# Patient Record
Sex: MALE | Race: WHITE | NOT HISPANIC OR LATINO | Employment: OTHER | URBAN - METROPOLITAN AREA
[De-identification: names, ages, dates, MRNs, and addresses within clinical notes are randomized per-mention and may not be internally consistent; named-entity substitution may affect disease eponyms.]

---

## 2018-05-22 ENCOUNTER — TRANSCRIBE ORDERS (OUTPATIENT)
Dept: ADMINISTRATIVE | Facility: HOSPITAL | Age: 68
End: 2018-05-22

## 2018-05-22 DIAGNOSIS — M54.2 CERVICALGIA: Primary | ICD-10-CM

## 2018-05-27 ENCOUNTER — HOSPITAL ENCOUNTER (OUTPATIENT)
Dept: CT IMAGING | Facility: HOSPITAL | Age: 68
Discharge: HOME | End: 2018-05-27
Attending: INTERNAL MEDICINE
Payer: COMMERCIAL

## 2018-05-27 DIAGNOSIS — M54.2 CERVICALGIA: ICD-10-CM

## 2018-05-27 PROCEDURE — 72125 CT NECK SPINE W/O DYE: CPT

## 2019-10-29 ENCOUNTER — TRANSCRIBE ORDERS (OUTPATIENT)
Dept: ADMINISTRATIVE | Facility: HOSPITAL | Age: 69
End: 2019-10-29

## 2019-10-29 DIAGNOSIS — I49.9 IRREGULAR HEARTBEAT: Primary | ICD-10-CM

## 2019-12-03 ENCOUNTER — HOSPITAL ENCOUNTER (OUTPATIENT)
Dept: NON INVASIVE DIAGNOSTICS | Facility: CLINIC | Age: 69
Discharge: HOME | End: 2019-12-03
Payer: COMMERCIAL

## 2019-12-03 DIAGNOSIS — I49.9 IRREGULAR HEARTBEAT: ICD-10-CM

## 2019-12-03 PROCEDURE — 93226 XTRNL ECG REC<48 HR SCAN A/R: CPT

## 2019-12-03 PROCEDURE — 93225 XTRNL ECG REC<48 HRS REC: CPT

## 2019-12-06 PROCEDURE — 93227 XTRNL ECG REC<48 HR R&I: CPT | Performed by: INTERNAL MEDICINE

## 2020-10-12 ENCOUNTER — TRANSCRIBE ORDERS (OUTPATIENT)
Dept: ADMINISTRATIVE | Facility: HOSPITAL | Age: 70
End: 2020-10-12

## 2020-10-12 DIAGNOSIS — R10.84 ABDOMINAL PAIN, GENERALIZED: Primary | ICD-10-CM

## 2020-10-14 ENCOUNTER — HOSPITAL ENCOUNTER (OUTPATIENT)
Dept: CT IMAGING | Facility: HOSPITAL | Age: 70
Discharge: HOME | End: 2020-10-14
Payer: COMMERCIAL

## 2020-10-14 DIAGNOSIS — R10.84 ABDOMINAL PAIN, GENERALIZED: ICD-10-CM

## 2020-10-14 PROCEDURE — 74177 CT ABD & PELVIS W/CONTRAST: CPT

## 2020-10-14 PROCEDURE — G1004 CDSM NDSC: HCPCS

## 2020-10-14 RX ADMIN — IOHEXOL 100 ML: 350 INJECTION, SOLUTION INTRAVENOUS at 09:10

## 2021-02-13 DIAGNOSIS — Z23 ENCOUNTER FOR IMMUNIZATION: ICD-10-CM

## 2021-02-25 ENCOUNTER — IMMUNIZATIONS (OUTPATIENT)
Dept: FAMILY MEDICINE CLINIC | Facility: HOSPITAL | Age: 71
End: 2021-02-25

## 2021-02-25 DIAGNOSIS — Z23 ENCOUNTER FOR IMMUNIZATION: Primary | ICD-10-CM

## 2021-02-25 PROCEDURE — 0001A SARS-COV-2 / COVID-19 MRNA VACCINE (PFIZER-BIONTECH) 30 MCG: CPT

## 2021-02-25 PROCEDURE — 91300 SARS-COV-2 / COVID-19 MRNA VACCINE (PFIZER-BIONTECH) 30 MCG: CPT

## 2021-03-16 ENCOUNTER — IMMUNIZATIONS (OUTPATIENT)
Dept: FAMILY MEDICINE CLINIC | Facility: HOSPITAL | Age: 71
End: 2021-03-16

## 2021-03-16 DIAGNOSIS — Z23 ENCOUNTER FOR IMMUNIZATION: Primary | ICD-10-CM

## 2021-03-16 PROCEDURE — 0002A SARS-COV-2 / COVID-19 MRNA VACCINE (PFIZER-BIONTECH) 30 MCG: CPT

## 2021-03-16 PROCEDURE — 91300 SARS-COV-2 / COVID-19 MRNA VACCINE (PFIZER-BIONTECH) 30 MCG: CPT

## 2021-08-23 ENCOUNTER — OFFICE VISIT (OUTPATIENT)
Dept: LAB | Facility: CLINIC | Age: 71
End: 2021-08-23
Payer: COMMERCIAL

## 2021-08-23 DIAGNOSIS — Z01.818 PRE-OP TESTING: ICD-10-CM

## 2021-08-23 PROCEDURE — 93005 ELECTROCARDIOGRAM TRACING: CPT

## 2021-08-25 LAB
ATRIAL RATE: 73 BPM
P AXIS: 53 DEGREES
PR INTERVAL: 178 MS
QRS AXIS: -46 DEGREES
QRSD INTERVAL: 94 MS
QT INTERVAL: 412 MS
QTC INTERVAL: 453 MS
T WAVE AXIS: 44 DEGREES
VENTRICULAR RATE: 73 BPM

## 2021-08-25 PROCEDURE — 93010 ELECTROCARDIOGRAM REPORT: CPT | Performed by: INTERNAL MEDICINE

## 2022-11-03 ENCOUNTER — TELEPHONE (OUTPATIENT)
Dept: GASTROENTEROLOGY | Facility: CLINIC | Age: 72
End: 2022-11-03

## 2022-11-09 ENCOUNTER — OFFICE VISIT (OUTPATIENT)
Dept: GASTROENTEROLOGY | Facility: CLINIC | Age: 72
End: 2022-11-09

## 2022-11-09 ENCOUNTER — TELEPHONE (OUTPATIENT)
Dept: GASTROENTEROLOGY | Facility: CLINIC | Age: 72
End: 2022-11-09

## 2022-11-09 VITALS
WEIGHT: 167 LBS | HEART RATE: 66 BPM | BODY MASS INDEX: 26.84 KG/M2 | SYSTOLIC BLOOD PRESSURE: 166 MMHG | HEIGHT: 66 IN | DIASTOLIC BLOOD PRESSURE: 75 MMHG

## 2022-11-09 DIAGNOSIS — Z86.79 HISTORY OF IRREGULAR HEARTBEAT: ICD-10-CM

## 2022-11-09 DIAGNOSIS — K59.1 FUNCTIONAL DIARRHEA: Primary | ICD-10-CM

## 2022-11-09 DIAGNOSIS — K21.9 GASTROESOPHAGEAL REFLUX DISEASE, UNSPECIFIED WHETHER ESOPHAGITIS PRESENT: ICD-10-CM

## 2022-11-09 RX ORDER — CETIRIZINE HYDROCHLORIDE 10 MG/1
10 TABLET ORAL DAILY
COMMUNITY

## 2022-11-09 RX ORDER — OLMESARTAN MEDOXOMIL 20 MG/1
TABLET ORAL
COMMUNITY
Start: 2022-11-07

## 2022-11-09 NOTE — PROGRESS NOTES
[facility] - Outpatient Consultation  Surya Ordoñez 67 y o  male MRN: [id number]  Encounter: [id number]          ASSESSMENT AND PLAN:      1  Functional diarrhea  Patient with history of suspected functional diarrhea  Patient did have random biopsy of colon before which were negative for microscopic colitis and patient is only having occasional loose stools after coffee few times a week  Plan colonoscopy, MiraLax prep can be used  - Colonoscopy; Future    2  Gastroesophageal reflux disease, unspecified whether esophagitis present  Patient with history of longstanding GERD and recommended EGD for further evaluation of reflux related complications the patient does not want to undergo EGD  He has chronic sore throat and feels that this exacerbates his symptoms  3  History of irregular heartbeat  Patient did have some skipped beats on exam today and I advised him to follow up with primary doctor but he reports that he had this in the past and did have monitor which was negative  Patient is asymptomatic without any evidence of palpitations  ______________________________________________________________________    HPI:    This is a 70-year-old male who was previously seen by our practice  Patient had colonoscopy in 2012 and at that point was having intermittent diarrhea which was thought to be functional with a component of lactose intolerance and he presents today for consultation to colonoscopy  Prior colonoscopy did show universal diverticulosis 1 small polyp was removed and a mildly redundant colon  Patient did have recent blood work through primary doctor which revealed normal CBC CMP and thyroid studies  He otherwise reports that he has no significant abdominal pain  Endorses that he has 1 loose stool after drinking coffee  He otherwise states that this does not happen every day  Denies any significant weight loss or any history of GI malignancy    Does have history of longstanding GERD and is taking Nexium which controls his symptoms  REVIEW OF SYSTEMS:    CONSTITUTIONAL: Denies any fever, chills, rigors, and weight loss  HEENT: No earache or tinnitus  Denies hearing loss or visual disturbances  CARDIOVASCULAR: No chest pain or palpitations  RESPIRATORY: Denies any cough, hemoptysis, shortness of breath or dyspnea on exertion  GASTROINTESTINAL: As noted in the History of Present Illness  GENITOURINARY: No problems with urination  Denies any hematuria or dysuria  NEUROLOGIC: No dizziness or vertigo, denies headaches  MUSCULOSKELETAL: Denies any muscle or joint pain  SKIN: Denies skin rashes or itching  ENDOCRINE: Denies excessive thirst  Denies intolerance to heat or cold  PSYCHOSOCIAL: Denies depression or anxiety  Denies any recent memory loss  Historical Information   Past Medical History:   Diagnosis Date   • Ear problems    • Otitis media      Past Surgical History:   Procedure Laterality Date   • ANTERIOR CERVICAL DISCECTOMY W/ FUSION     • DISCECTOMY SPINE CERVICAL ANTERIOR     • TYMPANOSTOMY TUBE PLACEMENT Bilateral      Social History   Social History     Substance and Sexual Activity   Alcohol Use None     Social History     Substance and Sexual Activity   Drug Use Not on file     Social History     Tobacco Use   Smoking Status Unknown If Ever Smoked   Smokeless Tobacco Never Used     History reviewed  No pertinent family history      Meds/Allergies       Current Outpatient Medications:   •  amLODIPine (NORVASC) 5 mg tablet  •  Ascorbic Acid (VITAMIN C PO)  •  busPIRone (BUSPAR) 15 mg tablet  •  cetirizine (ZyrTEC) 10 mg tablet  •  Cyanocobalamin (VITAMIN B12 PO)  •  esomeprazole (NexIUM) 40 MG capsule  •  rosuvastatin (CRESTOR) 10 MG tablet  •  Spiriva HandiHaler 18 MCG inhalation capsule  •  VITAMIN E PO  •  ciprofloxacin-dexamethasone (CIPRODEX) otic suspension  •  olmesartan (BENICAR) 20 mg tablet    Allergies   Allergen Reactions • Sulfa Antibiotics    • Tetracycline            Objective     Blood pressure 166/75, pulse 66, height 5' 6" (1 676 m), weight 75 8 kg (167 lb)  Body mass index is 26 95 kg/m²  PHYSICAL EXAM:      General Appearance:   Alert, cooperative, no distress   HEENT:   Normocephalic, atraumatic, anicteric      Neck:  Supple, symmetrical, trachea midline   Lungs:   Clear to auscultation bilaterally; no rales, rhonchi or wheezing; respirations unlabored    Heart[de-identified]   Regular rate and irregular rhythm which appears to be skipped beat; no murmur, rub, or gallop  Abdomen:   Soft, non-tender, non-distended; normal bowel sounds; no masses, no organomegaly    Genitalia:   Deferred    Rectal:   Deferred    Extremities:  No cyanosis, clubbing or edema    Pulses:  2+ and symmetric    Skin:  No jaundice, rashes, or lesions    Lymph nodes:  No palpable cervical lymphadenopathy        Lab Results:   No visits with results within 1 Day(s) from this visit  Latest known visit with results is:   Office Visit on 08/23/2021   Component Date Value   • Ventricular Rate 08/23/2021 73    • Atrial Rate 08/23/2021 73    • AL Interval 08/23/2021 178    • QRSD Interval 08/23/2021 94    • QT Interval 08/23/2021 412    • QTC Interval 08/23/2021 453    • P Axis 08/23/2021 53    • QRS Axis 08/23/2021 -46    • T Wave Axis 08/23/2021 44          Radiology Results:   No results found

## 2022-11-09 NOTE — TELEPHONE ENCOUNTER
Scheduled date of colonoscopy (as of today): 11/29/22  Physician performing colonoscopy:Nura  Location of colonoscopy:  [facility]  Bowel prep reviewed with patient: Ruperto / jaida  Instructions reviewed with patient by: Aletha Perry  Clearances: None

## 2022-11-09 NOTE — H&P (VIEW-ONLY)
[facility] - Outpatient Consultation  Elmo Schilder 67 y o  male MRN: [id number]  Encounter: [id number]          ASSESSMENT AND PLAN:      1  Functional diarrhea  Patient with history of suspected functional diarrhea  Patient did have random biopsy of colon before which were negative for microscopic colitis and patient is only having occasional loose stools after coffee few times a week  Plan colonoscopy, MiraLax prep can be used  - Colonoscopy; Future    2  Gastroesophageal reflux disease, unspecified whether esophagitis present  Patient with history of longstanding GERD and recommended EGD for further evaluation of reflux related complications the patient does not want to undergo EGD  He has chronic sore throat and feels that this exacerbates his symptoms  3  History of irregular heartbeat  Patient did have some skipped beats on exam today and I advised him to follow up with primary doctor but he reports that he had this in the past and did have monitor which was negative  Patient is asymptomatic without any evidence of palpitations  ______________________________________________________________________    HPI:    This is a 66-year-old male who was previously seen by our practice  Patient had colonoscopy in 2012 and at that point was having intermittent diarrhea which was thought to be functional with a component of lactose intolerance and he presents today for consultation to colonoscopy  Prior colonoscopy did show universal diverticulosis 1 small polyp was removed and a mildly redundant colon  Patient did have recent blood work through primary doctor which revealed normal CBC CMP and thyroid studies  He otherwise reports that he has no significant abdominal pain  Endorses that he has 1 loose stool after drinking coffee  He otherwise states that this does not happen every day  Denies any significant weight loss or any history of GI malignancy    Does have history of longstanding GERD and is taking Nexium which controls his symptoms  REVIEW OF SYSTEMS:    CONSTITUTIONAL: Denies any fever, chills, rigors, and weight loss  HEENT: No earache or tinnitus  Denies hearing loss or visual disturbances  CARDIOVASCULAR: No chest pain or palpitations  RESPIRATORY: Denies any cough, hemoptysis, shortness of breath or dyspnea on exertion  GASTROINTESTINAL: As noted in the History of Present Illness  GENITOURINARY: No problems with urination  Denies any hematuria or dysuria  NEUROLOGIC: No dizziness or vertigo, denies headaches  MUSCULOSKELETAL: Denies any muscle or joint pain  SKIN: Denies skin rashes or itching  ENDOCRINE: Denies excessive thirst  Denies intolerance to heat or cold  PSYCHOSOCIAL: Denies depression or anxiety  Denies any recent memory loss  Historical Information   Past Medical History:   Diagnosis Date   • Ear problems    • Otitis media      Past Surgical History:   Procedure Laterality Date   • ANTERIOR CERVICAL DISCECTOMY W/ FUSION     • DISCECTOMY SPINE CERVICAL ANTERIOR     • TYMPANOSTOMY TUBE PLACEMENT Bilateral      Social History   Social History     Substance and Sexual Activity   Alcohol Use None     Social History     Substance and Sexual Activity   Drug Use Not on file     Social History     Tobacco Use   Smoking Status Unknown If Ever Smoked   Smokeless Tobacco Never Used     History reviewed  No pertinent family history      Meds/Allergies       Current Outpatient Medications:   •  amLODIPine (NORVASC) 5 mg tablet  •  Ascorbic Acid (VITAMIN C PO)  •  busPIRone (BUSPAR) 15 mg tablet  •  cetirizine (ZyrTEC) 10 mg tablet  •  Cyanocobalamin (VITAMIN B12 PO)  •  esomeprazole (NexIUM) 40 MG capsule  •  rosuvastatin (CRESTOR) 10 MG tablet  •  Spiriva HandiHaler 18 MCG inhalation capsule  •  VITAMIN E PO  •  ciprofloxacin-dexamethasone (CIPRODEX) otic suspension  •  olmesartan (BENICAR) 20 mg tablet    Allergies   Allergen Reactions • Sulfa Antibiotics    • Tetracycline            Objective     Blood pressure 166/75, pulse 66, height 5' 6" (1 676 m), weight 75 8 kg (167 lb)  Body mass index is 26 95 kg/m²  PHYSICAL EXAM:      General Appearance:   Alert, cooperative, no distress   HEENT:   Normocephalic, atraumatic, anicteric      Neck:  Supple, symmetrical, trachea midline   Lungs:   Clear to auscultation bilaterally; no rales, rhonchi or wheezing; respirations unlabored    Heart[de-identified]   Regular rate and irregular rhythm which appears to be skipped beat; no murmur, rub, or gallop  Abdomen:   Soft, non-tender, non-distended; normal bowel sounds; no masses, no organomegaly    Genitalia:   Deferred    Rectal:   Deferred    Extremities:  No cyanosis, clubbing or edema    Pulses:  2+ and symmetric    Skin:  No jaundice, rashes, or lesions    Lymph nodes:  No palpable cervical lymphadenopathy        Lab Results:   No visits with results within 1 Day(s) from this visit  Latest known visit with results is:   Office Visit on 08/23/2021   Component Date Value   • Ventricular Rate 08/23/2021 73    • Atrial Rate 08/23/2021 73    • KS Interval 08/23/2021 178    • QRSD Interval 08/23/2021 94    • QT Interval 08/23/2021 412    • QTC Interval 08/23/2021 453    • P Axis 08/23/2021 53    • QRS Axis 08/23/2021 -46    • T Wave Axis 08/23/2021 44          Radiology Results:   No results found

## 2022-11-21 ENCOUNTER — TELEPHONE (OUTPATIENT)
Dept: GASTROENTEROLOGY | Facility: CLINIC | Age: 72
End: 2022-11-21

## 2022-11-21 NOTE — TELEPHONE ENCOUNTER
lmom confirming pt's colonoscopy scheduled on 11/28/22 at [facility] with Dr Lala Main  Informed [facility] would be calling the  with the arrival time  Informed of clear liquid diet day prior as well as the bowel cleansing preparation  Informed would need a  the day of the procedure due to being under sedation  I asked pt to please call back if has not received instructions or if has any questions  Advised pt to contact insurance if has any questions regarding coverage for procedure

## 2022-11-28 ENCOUNTER — HOSPITAL ENCOUNTER (OUTPATIENT)
Dept: GASTROENTEROLOGY | Facility: HOSPITAL | Age: 72
Setting detail: OUTPATIENT SURGERY
Discharge: HOME | End: 2022-11-28

## 2022-11-28 ENCOUNTER — ANESTHESIA EVENT (OUTPATIENT)
Dept: GASTROENTEROLOGY | Facility: HOSPITAL | Age: 72
End: 2022-11-28

## 2022-11-28 ENCOUNTER — ANESTHESIA (OUTPATIENT)
Dept: GASTROENTEROLOGY | Facility: HOSPITAL | Age: 72
End: 2022-11-28

## 2022-11-28 VITALS
RESPIRATION RATE: 13 BRPM | TEMPERATURE: 97.8 F | SYSTOLIC BLOOD PRESSURE: 145 MMHG | BODY MASS INDEX: 25.65 KG/M2 | WEIGHT: 159.6 LBS | HEIGHT: 66 IN | OXYGEN SATURATION: 99 % | DIASTOLIC BLOOD PRESSURE: 83 MMHG | HEART RATE: 59 BPM

## 2022-11-28 DIAGNOSIS — K59.1 FUNCTIONAL DIARRHEA: ICD-10-CM

## 2022-11-28 RX ORDER — PROPOFOL 10 MG/ML
INJECTION, EMULSION INTRAVENOUS AS NEEDED
Status: DISCONTINUED | OUTPATIENT
Start: 2022-11-28 | End: 2022-11-28

## 2022-11-28 RX ORDER — SODIUM CHLORIDE, SODIUM LACTATE, POTASSIUM CHLORIDE, CALCIUM CHLORIDE 600; 310; 30; 20 MG/100ML; MG/100ML; MG/100ML; MG/100ML
INJECTION, SOLUTION INTRAVENOUS CONTINUOUS PRN
Status: DISCONTINUED | OUTPATIENT
Start: 2022-11-28 | End: 2022-11-28

## 2022-11-28 RX ORDER — LANOLIN ALCOHOL/MO/W.PET/CERES
1 CREAM (GRAM) TOPICAL 3 TIMES DAILY
COMMUNITY

## 2022-11-28 RX ORDER — LIDOCAINE HYDROCHLORIDE 10 MG/ML
INJECTION, SOLUTION EPIDURAL; INFILTRATION; INTRACAUDAL; PERINEURAL AS NEEDED
Status: DISCONTINUED | OUTPATIENT
Start: 2022-11-28 | End: 2022-11-28

## 2022-11-28 RX ORDER — MULTIVITAMIN
1 CAPSULE ORAL DAILY
COMMUNITY

## 2022-11-28 RX ORDER — VITAMIN B COMPLEX
1 CAPSULE ORAL DAILY
COMMUNITY

## 2022-11-28 RX ORDER — ZINC SULFATE 50(220)MG
220 CAPSULE ORAL DAILY
COMMUNITY

## 2022-11-28 RX ADMIN — PROPOFOL 40 MG: 10 INJECTION, EMULSION INTRAVENOUS at 11:43

## 2022-11-28 RX ADMIN — PROPOFOL 20 MG: 10 INJECTION, EMULSION INTRAVENOUS at 11:49

## 2022-11-28 RX ADMIN — PROPOFOL 40 MG: 10 INJECTION, EMULSION INTRAVENOUS at 11:37

## 2022-11-28 RX ADMIN — PROPOFOL 40 MG: 10 INJECTION, EMULSION INTRAVENOUS at 11:46

## 2022-11-28 RX ADMIN — LIDOCAINE HYDROCHLORIDE 50 MG: 10 INJECTION, SOLUTION EPIDURAL; INFILTRATION; INTRACAUDAL; PERINEURAL at 11:25

## 2022-11-28 RX ADMIN — PROPOFOL 100 MG: 10 INJECTION, EMULSION INTRAVENOUS at 11:25

## 2022-11-28 RX ADMIN — PROPOFOL 50 MG: 10 INJECTION, EMULSION INTRAVENOUS at 11:31

## 2022-11-28 RX ADMIN — PROPOFOL 40 MG: 10 INJECTION, EMULSION INTRAVENOUS at 11:40

## 2022-11-28 RX ADMIN — SODIUM CHLORIDE, SODIUM LACTATE, POTASSIUM CHLORIDE, AND CALCIUM CHLORIDE: .6; .31; .03; .02 INJECTION, SOLUTION INTRAVENOUS at 11:23

## 2022-11-28 RX ADMIN — PROPOFOL 50 MG: 10 INJECTION, EMULSION INTRAVENOUS at 11:28

## 2022-11-28 RX ADMIN — PROPOFOL 40 MG: 10 INJECTION, EMULSION INTRAVENOUS at 11:34

## 2022-11-28 NOTE — INTERVAL H&P NOTE
H&P reviewed  After examining the patient I find no changes in the patients condition since the H&P had been written      Vitals:    11/28/22 1019   BP: (!) 171/70   Pulse: 71   Resp: 15   Temp: 97 8 °F (36 6 °C)   SpO2: 100%

## 2022-11-28 NOTE — ANESTHESIA PREPROCEDURE EVALUATION
Procedure:  COLONOSCOPY    Relevant Problems   PULMONARY   (+) COPD (chronic obstructive pulmonary disease) (HCC)        Physical Exam    Airway    Mallampati score: II  TM Distance: >3 FB  Neck ROM: full     Dental   lower dentures,     Cardiovascular      Pulmonary      Other Findings        Anesthesia Plan  ASA Score- 2     Anesthesia Type- IV sedation with anesthesia with ASA Monitors  Additional Monitors:   Airway Plan:           Plan Factors-Exercise tolerance (METS): >4 METS  Chart reviewed  EKG reviewed  Imaging results reviewed  Existing labs reviewed  Patient summary reviewed  Induction- intravenous  Postoperative Plan-     Informed Consent- Anesthetic plan and risks discussed with patient  I personally reviewed this patient with the CRNA  Discussed and agreed on the Anesthesia Plan with the CRNA  Pritesh Manning

## 2022-11-28 NOTE — ANESTHESIA POSTPROCEDURE EVALUATION
Post-Op Assessment Note    CV Status:  Stable  Pain Score: 0    Pain management: adequate     Mental Status:  Alert and awake   Hydration Status:  Euvolemic   PONV Controlled:  Controlled   Airway Patency:  Patent      Post Op Vitals Reviewed: Yes      Staff: CRNA         No notable events documented      BP  106/58   Temp 97 2   Pulse 68   Resp 14   SpO2 97

## 2022-12-02 NOTE — RESULT ENCOUNTER NOTE
Please inform patient that their biopsies were benign    Repeat colonoscopy in 5 years instead of 7 due to sessile serrated adenoma

## 2022-12-31 ENCOUNTER — HOSPITAL ENCOUNTER (OUTPATIENT)
Dept: ULTRASOUND IMAGING | Facility: HOSPITAL | Age: 72
Discharge: HOME | End: 2022-12-31

## 2022-12-31 DIAGNOSIS — R93.89 ABNORMAL FINDINGS ON DIAGNOSTIC IMAGING OF OTHER SPECIFIED BODY STRUCTURES: ICD-10-CM

## 2023-05-15 ENCOUNTER — HOSPITAL ENCOUNTER (OUTPATIENT)
Dept: NON INVASIVE DIAGNOSTICS | Facility: HOSPITAL | Age: 73
Discharge: HOME | End: 2023-05-15

## 2023-05-15 DIAGNOSIS — R00.2 PALPITATIONS: ICD-10-CM

## 2024-01-19 ENCOUNTER — OFFICE VISIT (OUTPATIENT)
Dept: CARDIOLOGY CLINIC | Facility: CLINIC | Age: 74
End: 2024-01-19
Payer: COMMERCIAL

## 2024-01-19 VITALS
BODY MASS INDEX: 26.2 KG/M2 | HEIGHT: 66 IN | WEIGHT: 163 LBS | HEART RATE: 65 BPM | SYSTOLIC BLOOD PRESSURE: 152 MMHG | DIASTOLIC BLOOD PRESSURE: 88 MMHG

## 2024-01-19 DIAGNOSIS — E78.00 HYPERCHOLESTEROLEMIA: ICD-10-CM

## 2024-01-19 DIAGNOSIS — R07.89 CHEST TIGHTNESS: Primary | ICD-10-CM

## 2024-01-19 DIAGNOSIS — R06.02 SHORTNESS OF BREATH: ICD-10-CM

## 2024-01-19 DIAGNOSIS — I49.1 PREMATURE SUPRAVENTRICULAR BEATS: ICD-10-CM

## 2024-01-19 DIAGNOSIS — I10 PRIMARY HYPERTENSION: ICD-10-CM

## 2024-01-19 PROCEDURE — 93000 ELECTROCARDIOGRAM COMPLETE: CPT | Performed by: INTERNAL MEDICINE

## 2024-01-19 PROCEDURE — 99204 OFFICE O/P NEW MOD 45 MIN: CPT | Performed by: INTERNAL MEDICINE

## 2024-01-19 RX ORDER — FLUTICASONE PROPIONATE 50 MCG
2 SPRAY, SUSPENSION (ML) NASAL DAILY
COMMUNITY
Start: 2021-08-14

## 2024-01-19 RX ORDER — SOY ISOFLAVONE 40 MG
TABLET ORAL
COMMUNITY
Start: 1999-10-26

## 2024-01-19 RX ORDER — MULTIVITAMIN WITH IRON
TABLET ORAL
COMMUNITY
Start: 2023-10-18

## 2024-01-19 NOTE — PROGRESS NOTES
"                                           Cardiology Consultation     Gurvinder Sharif  [id number]  1950  HEART & VASCULAR [facility]  [address] [place] [id number]    1. Chest tightness  Echo complete w/ contrast if indicated    NM myocardial perfusion spect (stress and/or rest)      2. Shortness of breath  Echo complete w/ contrast if indicated    NM myocardial perfusion spect (stress and/or rest)      3. Premature supraventricular beats  POCT ECG    Echo complete w/ contrast if indicated    NM myocardial perfusion spect (stress and/or rest)      4. Primary hypertension        5. Hypercholesterolemia            Discussion/Summary:    Chest tightness/shortness of breath -Gurvinder appears to get this a lot with anxiety/panic attacks.  He also does get shortness of breath with exertion.  Given the symptoms, his risk factors and his frequent PACs we did order an ischemic evaluation.  He will have a stress nuclear study and echocardiogram in the near future.    Frequent PACs - He has been noted to have this for at least the last 4 to 5 years.  He is asymptomatic from the standpoint.  At this point we will observe and not change any treatment.  There is evidence that patients with frequent PACs do have somewhat a higher risk of atrial fibrillation in the future.  After his ischemic evaluation we will consider longer-term monitoring.    Hypertension - His blood pressure is elevated today and he attributes this to anxiety.  He does check his blood pressure regularly at home and he tells me it is normal.    Hypercholesterolemia - Gurvinder's last LDL was 74.  He gets blood work checked regularly by his PCP.      HPI:    Mr. Sharif comes in for a consultation given his history of an \"irregular rhythm\" and frequent PACs.  Gurvinder has been known to have these PACs for several years.  More recently he tells me that his PCP heard more irregularity and ordered another " Holter monitor.  This demonstrated frequent PACs upwards of 20% of total beats.  These findings were similar to a Holter monitor that he wore in December 2019.  At that point his PCP ordered a stress nuclear study and echo but they were denied by insurance.  Gurvinder to us for consultation.  He has no prior cardiac history although he tells me he had rheumatic fever as a child.  He is treated for hypertension and hypercholesterolemia.    Gurvinder does not feel these PACs.  He denies palpitations, feelings of any tachyarrhythmia, or any significant lightheadedness.  He does get some mild lightheadedness if he stands up too quickly.  He has not had any syncope.  He does admit to chest tightness and shortness of breath particularly with anxiety.  He battles with anxiety and what sounds like panic attacks, and with this he will get chest tightness and shortness of breath.  He also does get some shortness of breath with exertion but no chest tightness with exertion.  He is also treated for COPD.  He denies any other signs or symptoms of CHF.  No orthopnea/PND.        Patient Active Problem List   Diagnosis    COPD (chronic obstructive pulmonary disease) (HCC)    Premature supraventricular beats    Hypertension    Hypercholesterolemia     Past Medical History:   Diagnosis Date    COPD (chronic obstructive pulmonary disease) (HCC)     Ear problems     Otitis media      Social History     Socioeconomic History    Marital status: Single     Spouse name: Not on file    Number of children: Not on file    Years of education: Not on file    Highest education level: Not on file   Occupational History    Not on file   Tobacco Use    Smoking status: Unknown    Smokeless tobacco: Never   Vaping Use    Vaping status: Never Used   Substance and Sexual Activity    Alcohol use: Not on file     Comment: social    Drug use: Never    Sexual activity: Not on file   Other Topics Concern    Not on file   Social History Narrative    Not on file      Social Determinants of Health     Financial Resource Strain: Not on file   Food Insecurity: Not on file   Transportation Needs: Not on file   Physical Activity: Not on file   Stress: Not on file   Social Connections: Not on file   Intimate Partner Violence: Not on file   Housing Stability: Not on file      History reviewed. No pertinent family history.  Past Surgical History:   Procedure Laterality Date    ANTERIOR CERVICAL DISCECTOMY W/ FUSION      DISCECTOMY SPINE CERVICAL ANTERIOR      TYMPANOSTOMY TUBE PLACEMENT Bilateral        Current Outpatient Medications:     Ascorbic Acid (VITAMIN C PO), Take by mouth, Disp: , Rfl:     b complex vitamins capsule, Take 1 capsule by mouth daily, Disp: , Rfl:     busPIRone (BUSPAR) 15 mg tablet, Take 15 mg by mouth every 12 (twelve) hours, Disp: , Rfl:     Cats Claw 500 MG CAPS, , Disp: , Rfl:     cetirizine (ZyrTEC) 10 mg tablet, Take 10 mg by mouth daily, Disp: , Rfl:     Cyanocobalamin (VITAMIN B12 PO), Take by mouth, Disp: , Rfl:     esomeprazole (NexIUM) 40 MG capsule, Take 40 mg by mouth daily, Disp: , Rfl:     fluticasone (Flonase Allergy Relief) 50 mcg/act nasal spray, 2 sprays into each nostril daily, Disp: , Rfl:     glucosamine-chondroitin 500-400 MG tablet, Take 1 tablet by mouth 3 (three) times a day, Disp: , Rfl:     Magnesium 250 MG TABS, , Disp: , Rfl:     Methylsulfonylmethane (MSM) 1000 MG CAPS, , Disp: , Rfl:     Multiple Vitamin (multivitamin) capsule, Take 1 capsule by mouth daily, Disp: , Rfl:     olmesartan (BENICAR) 40 mg tablet, Take 40 mg by mouth daily, Disp: , Rfl:     rosuvastatin (CRESTOR) 10 MG tablet, Take 10 mg by mouth daily, Disp: , Rfl:     Spiriva HandiHaler 18 MCG inhalation capsule, , Disp: , Rfl:     VITAMIN E PO, Take by mouth, Disp: , Rfl:     zinc sulfate (ZINCATE) 220 mg capsule, Take 220 mg by mouth daily, Disp: , Rfl:   Allergies   Allergen Reactions    Sulfa Antibiotics     Tetracycline      Vitals:    01/19/24 0849   BP:  "152/88   BP Location: Left arm   Patient Position: Sitting   Cuff Size: Standard   Pulse: 65   Weight: 73.9 kg (163 lb)   Height: 5' 6\" (1.676 m)       Labs: Reviewed with patient as he brought his own labs from August 2023.  CMP/CBC unremarkable.  LDL 74.    Imaging:  ECG obtained today demonstrates sinus rhythm with occasional PACs.    Review of Systems:  Review of Systems   Constitutional: Negative.    HENT: Negative.     Eyes: Negative.    Respiratory:  Positive for chest tightness and shortness of breath.    Cardiovascular: Negative.    Gastrointestinal: Negative.    Musculoskeletal: Negative.    Skin: Negative.    Allergic/Immunologic: Negative.    Neurological: Negative.    Hematological: Negative.    Psychiatric/Behavioral:  The patient is nervous/anxious.    All other systems reviewed and are negative.    Vitals:    01/19/24 0849   BP: 152/88   BP Location: Left arm   Patient Position: Sitting   Cuff Size: Standard   Pulse: 65   Weight: 73.9 kg (163 lb)   Height: 5' 6\" (1.676 m)      Physical Exam  Constitutional:       Appearance: He is well-developed.   HENT:      Head: Normocephalic and atraumatic.   Eyes:      General: No scleral icterus.        Right eye: No discharge.         Left eye: No discharge.      Pupils: Pupils are equal, round, and reactive to light.   Neck:      Thyroid: No thyromegaly.      Vascular: No JVD.   Cardiovascular:      Rate and Rhythm: Normal rate and regular rhythm. Extrasystoles are present.     Pulses: Normal pulses. No decreased pulses.      Heart sounds: Normal heart sounds, S1 normal and S2 normal. No murmur heard.     No friction rub. No gallop.   Pulmonary:      Effort: Pulmonary effort is normal. No respiratory distress.      Breath sounds: Normal breath sounds. No wheezing, rhonchi or rales.   Abdominal:      General: Bowel sounds are normal. There is no distension.      Palpations: Abdomen is soft.      Tenderness: There is no abdominal tenderness.   Musculoskeletal:  "        General: No tenderness or deformity. Normal range of motion.      Cervical back: Normal range of motion and neck supple.      Right lower leg: No edema.      Left lower leg: No edema.   Skin:     General: Skin is warm and dry.      Findings: No rash.   Neurological:      Mental Status: He is alert and oriented to person, place, and time.      Cranial Nerves: No cranial nerve deficit.   Psychiatric:         Thought Content: Thought content normal.         Judgment: Judgment normal.       Counseling / Coordination of Care  Total office time spent today 40 minutes.  Greater than 50% of total time was spent with the patient and / or family counseling and / or coordination of care.

## 2024-01-30 ENCOUNTER — HOSPITAL ENCOUNTER (OUTPATIENT)
Dept: NON INVASIVE DIAGNOSTICS | Facility: CLINIC | Age: 74
Discharge: HOME | End: 2024-01-30
Payer: COMMERCIAL

## 2024-01-30 VITALS
HEART RATE: 65 BPM | DIASTOLIC BLOOD PRESSURE: 88 MMHG | SYSTOLIC BLOOD PRESSURE: 152 MMHG | BODY MASS INDEX: 26.2 KG/M2 | WEIGHT: 163 LBS | HEIGHT: 66 IN

## 2024-01-30 VITALS — WEIGHT: 163 LBS | HEIGHT: 66 IN | BODY MASS INDEX: 26.2 KG/M2

## 2024-01-30 DIAGNOSIS — I49.1 PREMATURE SUPRAVENTRICULAR BEATS: ICD-10-CM

## 2024-01-30 DIAGNOSIS — R06.02 SHORTNESS OF BREATH: ICD-10-CM

## 2024-01-30 DIAGNOSIS — R07.89 CHEST TIGHTNESS: ICD-10-CM

## 2024-01-30 LAB
AORTIC ROOT: 3.3 CM
APICAL FOUR CHAMBER EJECTION FRACTION: 66 %
ASCENDING AORTA: 4 CM
CHEST PAIN STATEMENT: NORMAL
E WAVE DECELERATION TIME: 253 MS
E/A RATIO: 0.7
FRACTIONAL SHORTENING: 37 (ref 28–44)
INTERVENTRICULAR SEPTUM IN DIASTOLE (PARASTERNAL SHORT AXIS VIEW): 0.8 CM
INTERVENTRICULAR SEPTUM: 0.8 CM (ref 0.6–1.1)
LAAS-AP2: 20.3 CM2
LAAS-AP4: 20.4 CM2
LEFT ATRIUM SIZE: 3.5 CM
LEFT ATRIUM VOLUME (MOD BIPLANE): 56 ML
LEFT ATRIUM VOLUME INDEX (MOD BIPLANE): 30.6 ML/M2
LEFT INTERNAL DIMENSION IN SYSTOLE: 3.2 CM (ref 2.1–4)
LEFT VENTRICLE DIASTOLIC VOLUME (MOD BIPLANE): 89 ML
LEFT VENTRICLE SYSTOLIC VOLUME (MOD BIPLANE): 35 ML
LEFT VENTRICULAR INTERNAL DIMENSION IN DIASTOLE: 5.1 CM (ref 3.5–6)
LEFT VENTRICULAR POSTERIOR WALL IN END DIASTOLE: 0.8 CM
LEFT VENTRICULAR STROKE VOLUME: 82 ML
LV EF: 61 %
LVSV (TEICH): 82 ML
MAX DIASTOLIC BP: 72 MMHG
MAX HR PERCENT: 85 %
MAX HR: 125 BPM
MAX PREDICTED HEART RATE: 147 BPM
MV E'TISSUE VEL-SEP: 9 CM/S
MV PEAK A VEL: 0.7 M/S
MV PEAK E VEL: 49 CM/S
MV STENOSIS PRESSURE HALF TIME: 73 MS
MV VALVE AREA P 1/2 METHOD: 3.01
NUC STRESS EJECTION FRACTION: 67 %
PROTOCOL NAME: NORMAL
RATE PRESSURE PRODUCT: NORMAL
REASON FOR TERMINATION: NORMAL
RIGHT ATRIUM AREA SYSTOLE A4C: 14.8 CM2
RIGHT VENTRICLE ID DIMENSION: 4.1 CM
SL CV LEFT ATRIUM LENGTH A2C: 5.8 CM
SL CV LV EF: 65
SL CV PED ECHO LEFT VENTRICLE DIASTOLIC VOLUME (MOD BIPLANE) 2D: 124 ML
SL CV PED ECHO LEFT VENTRICLE SYSTOLIC VOLUME (MOD BIPLANE) 2D: 42 ML
SL CV REST NUCLEAR ISOTOPE DOSE: 10.84 MCI
SL CV STRESS NUCLEAR ISOTOPE DOSE: 31.9 MCI
SL CV STRESS RECOVERY BP: NORMAL MMHG
SL CV STRESS RECOVERY HR: 90 BPM
SL CV STRESS RECOVERY O2 SAT: 98 %
STRESS ANGINA INDEX: 0
STRESS BASELINE BP: NORMAL MMHG
STRESS BASELINE HR: 62 BPM
STRESS O2 SAT REST: 98 %
STRESS PEAK HR: 125 BPM
STRESS POST ESTIMATED WORKLOAD: 10.1 METS
STRESS POST EXERCISE DUR MIN: 7 MIN
STRESS POST EXERCISE DUR MIN: 7 MIN
STRESS POST EXERCISE DUR SEC: 0 SEC
STRESS POST EXERCISE DUR SEC: 0 SEC
STRESS POST O2 SAT PEAK: 96 %
STRESS POST PEAK BP: 182 MMHG
STRESS POST PEAK HR: 125 BPM
STRESS POST PEAK SYSTOLIC BP: 182 MMHG
STRESS/REST PERFUSION RATIO: 0.78
TARGET HR FORMULA: NORMAL
TEST INDICATION: NORMAL
TRICUSPID ANNULAR PLANE SYSTOLIC EXCURSION: 2.7 CM

## 2024-01-30 PROCEDURE — G1004 CDSM NDSC: HCPCS

## 2024-01-30 PROCEDURE — A9502 TC99M TETROFOSMIN: HCPCS

## 2024-01-30 PROCEDURE — 93017 CV STRESS TEST TRACING ONLY: CPT

## 2024-01-30 PROCEDURE — 78452 HT MUSCLE IMAGE SPECT MULT: CPT

## 2024-01-30 PROCEDURE — 93016 CV STRESS TEST SUPVJ ONLY: CPT | Performed by: INTERNAL MEDICINE

## 2024-01-30 PROCEDURE — 78452 HT MUSCLE IMAGE SPECT MULT: CPT | Performed by: INTERNAL MEDICINE

## 2024-01-30 PROCEDURE — 93018 CV STRESS TEST I&R ONLY: CPT | Performed by: INTERNAL MEDICINE

## 2024-01-30 PROCEDURE — 93306 TTE W/DOPPLER COMPLETE: CPT | Performed by: INTERNAL MEDICINE

## 2024-01-30 PROCEDURE — 93306 TTE W/DOPPLER COMPLETE: CPT

## 2024-04-22 ENCOUNTER — NEW PATIENT (OUTPATIENT)
Dept: URBAN - METROPOLITAN AREA CLINIC 6 | Facility: CLINIC | Age: 74
End: 2024-04-22

## 2024-04-22 DIAGNOSIS — H02.831: ICD-10-CM

## 2024-04-22 DIAGNOSIS — H35.372: ICD-10-CM

## 2024-04-22 DIAGNOSIS — H25.813: ICD-10-CM

## 2024-04-22 DIAGNOSIS — H02.885: ICD-10-CM

## 2024-04-22 DIAGNOSIS — H02.834: ICD-10-CM

## 2024-04-22 DIAGNOSIS — H02.882: ICD-10-CM

## 2024-04-22 PROCEDURE — 99204 OFFICE O/P NEW MOD 45 MIN: CPT

## 2024-04-22 ASSESSMENT — VISUAL ACUITY
OU_CC: J2
OS_GLARE: 20/60
OD_CC: 20/70
OS_CC: 20/40+1
OD_GLARE: 20/80
OD_PH: 20/40

## 2024-04-22 ASSESSMENT — TONOMETRY
OD_IOP_MMHG: 18
OS_IOP_MMHG: 17

## 2024-05-22 ENCOUNTER — PRE-OP CATARACT MEASUREMENTS (OUTPATIENT)
Dept: URBAN - METROPOLITAN AREA CLINIC 6 | Facility: CLINIC | Age: 74
End: 2024-05-22

## 2024-05-22 DIAGNOSIS — H02.834: ICD-10-CM

## 2024-05-22 DIAGNOSIS — H02.885: ICD-10-CM

## 2024-05-22 DIAGNOSIS — H02.882: ICD-10-CM

## 2024-05-22 DIAGNOSIS — H02.831: ICD-10-CM

## 2024-05-22 DIAGNOSIS — H25.813: ICD-10-CM

## 2024-05-22 DIAGNOSIS — H35.372: ICD-10-CM

## 2024-05-22 PROCEDURE — 92136 OPHTHALMIC BIOMETRY: CPT

## 2024-05-22 PROCEDURE — 92012 INTRM OPH EXAM EST PATIENT: CPT

## 2024-05-22 ASSESSMENT — KERATOMETRY
OD_K2POWER_DIOPTERS: 44.75
OS_AXISANGLE_DEGREES: 106
OS_K2POWER_DIOPTERS: 45.00
OD_AXISANGLE_DEGREES: 095
OD_AXISANGLE2_DEGREES: 5
OS_AXISANGLE2_DEGREES: 16
OS_K1POWER_DIOPTERS: 44.25
OD_K1POWER_DIOPTERS: 43.75

## 2024-05-22 ASSESSMENT — TONOMETRY
OD_IOP_MMHG: 19
OS_IOP_MMHG: 17

## 2024-05-22 ASSESSMENT — VISUAL ACUITY
OD_CC: 20/50
OS_CC: 20/50

## 2024-05-28 ENCOUNTER — SURGERY/PROCEDURE (OUTPATIENT)
Dept: URBAN - METROPOLITAN AREA SURGICAL CENTER 6 | Facility: SURGICAL CENTER | Age: 74
End: 2024-05-28

## 2024-05-28 DIAGNOSIS — H25.811: ICD-10-CM

## 2024-05-28 PROCEDURE — 66984 XCAPSL CTRC RMVL W/O ECP: CPT

## 2024-05-29 ENCOUNTER — 1 DAY POST-OP (OUTPATIENT)
Dept: URBAN - METROPOLITAN AREA CLINIC 6 | Facility: CLINIC | Age: 74
End: 2024-05-29

## 2024-05-29 DIAGNOSIS — H25.812: ICD-10-CM

## 2024-05-29 DIAGNOSIS — Z96.1: ICD-10-CM

## 2024-05-29 PROCEDURE — 99024 POSTOP FOLLOW-UP VISIT: CPT

## 2024-05-29 PROCEDURE — 92136 OPHTHALMIC BIOMETRY: CPT | Mod: 26,LT

## 2024-05-29 ASSESSMENT — KERATOMETRY
OD_AXISANGLE_DEGREES: 095
OD_K1POWER_DIOPTERS: 43.75
OS_AXISANGLE2_DEGREES: 16
OD_AXISANGLE2_DEGREES: 5
OS_K2POWER_DIOPTERS: 45.00
OD_K2POWER_DIOPTERS: 44.75
OS_AXISANGLE_DEGREES: 106
OS_K1POWER_DIOPTERS: 44.25

## 2024-05-29 ASSESSMENT — TONOMETRY
OD_IOP_MMHG: 27
OD_IOP_MMHG: 28
OD_IOP_MMHG: 32
OS_IOP_MMHG: 20

## 2024-05-29 ASSESSMENT — VISUAL ACUITY: OD_SC: 20/30+2

## 2024-05-31 ASSESSMENT — KERATOMETRY
OS_AXISANGLE_DEGREES: 106
OS_K2POWER_DIOPTERS: 45.00
OD_K2POWER_DIOPTERS: 44.75
OS_K1POWER_DIOPTERS: 44.25
OD_AXISANGLE2_DEGREES: 5
OD_AXISANGLE_DEGREES: 095
OD_K1POWER_DIOPTERS: 43.75
OS_AXISANGLE2_DEGREES: 16

## 2024-06-04 ENCOUNTER — SURGERY/PROCEDURE (OUTPATIENT)
Dept: URBAN - METROPOLITAN AREA SURGICAL CENTER 6 | Facility: SURGICAL CENTER | Age: 74
End: 2024-06-04

## 2024-06-04 DIAGNOSIS — H25.812: ICD-10-CM

## 2024-06-04 PROCEDURE — 66984 XCAPSL CTRC RMVL W/O ECP: CPT | Mod: 79,LT

## 2024-06-05 ENCOUNTER — 1 DAY POST-OP (OUTPATIENT)
Dept: URBAN - METROPOLITAN AREA CLINIC 6 | Facility: CLINIC | Age: 74
End: 2024-06-05

## 2024-06-05 DIAGNOSIS — Z96.1: ICD-10-CM

## 2024-06-05 PROCEDURE — 99024 POSTOP FOLLOW-UP VISIT: CPT

## 2024-06-05 ASSESSMENT — VISUAL ACUITY
OS_SC: 20/30
OD_SC: 20/25

## 2024-06-05 ASSESSMENT — KERATOMETRY
OS_AXISANGLE2_DEGREES: 16
OD_K2POWER_DIOPTERS: 44.75
OD_AXISANGLE_DEGREES: 095
OS_K2POWER_DIOPTERS: 45.00
OS_AXISANGLE_DEGREES: 106
OD_K1POWER_DIOPTERS: 43.75
OD_AXISANGLE2_DEGREES: 5
OS_K1POWER_DIOPTERS: 44.25

## 2024-06-05 ASSESSMENT — TONOMETRY
OD_IOP_MMHG: 21
OS_IOP_MMHG: 26

## 2024-06-07 ASSESSMENT — KERATOMETRY
OS_K2POWER_DIOPTERS: 45.00
OS_AXISANGLE_DEGREES: 106
OD_AXISANGLE2_DEGREES: 5
OS_K1POWER_DIOPTERS: 44.25
OD_AXISANGLE_DEGREES: 095
OD_K2POWER_DIOPTERS: 44.75
OS_AXISANGLE2_DEGREES: 16
OD_K1POWER_DIOPTERS: 43.75

## 2024-06-12 ENCOUNTER — 1 WEEK POST-OP (OUTPATIENT)
Dept: URBAN - METROPOLITAN AREA CLINIC 6 | Facility: CLINIC | Age: 74
End: 2024-06-12

## 2024-06-12 DIAGNOSIS — Z96.1: ICD-10-CM

## 2024-06-12 PROCEDURE — 99024 POSTOP FOLLOW-UP VISIT: CPT

## 2024-06-12 ASSESSMENT — TONOMETRY
OS_IOP_MMHG: 25
OD_IOP_MMHG: 25

## 2024-06-12 ASSESSMENT — KERATOMETRY
OS_K1POWER_DIOPTERS: 44.25
OD_AXISANGLE_DEGREES: 095
OD_AXISANGLE2_DEGREES: 5
OS_AXISANGLE2_DEGREES: 16
OD_K1POWER_DIOPTERS: 43.75
OS_AXISANGLE_DEGREES: 106
OD_K2POWER_DIOPTERS: 44.75
OS_K2POWER_DIOPTERS: 45.00

## 2024-06-12 ASSESSMENT — VISUAL ACUITY
OS_SC: 20/30-2
OS_PH: 20/20-2
OD_SC: 20/30-1

## 2024-07-18 ENCOUNTER — 1 MONTH POST-OP (OUTPATIENT)
Dept: URBAN - METROPOLITAN AREA CLINIC 6 | Facility: CLINIC | Age: 74
End: 2024-07-18

## 2024-07-18 DIAGNOSIS — Z96.1: ICD-10-CM

## 2024-07-18 DIAGNOSIS — H35.372: ICD-10-CM

## 2024-07-18 PROCEDURE — 99024 POSTOP FOLLOW-UP VISIT: CPT

## 2024-07-18 PROCEDURE — 92134 CPTRZ OPH DX IMG PST SGM RTA: CPT

## 2024-07-18 ASSESSMENT — VISUAL ACUITY
OD_SC: 20/40-2
OS_SC: 20/50-1
OS_PH: 20/30-2
OD_PH: 20/30-2

## 2024-07-18 ASSESSMENT — TONOMETRY
OD_IOP_MMHG: 15
OS_IOP_MMHG: 14

## 2024-07-18 ASSESSMENT — KERATOMETRY
OD_AXISANGLE_DEGREES: 095
OS_AXISANGLE_DEGREES: 106
OD_K1POWER_DIOPTERS: 43.75
OS_K2POWER_DIOPTERS: 45.00
OD_K2POWER_DIOPTERS: 44.75
OS_AXISANGLE2_DEGREES: 16
OS_K1POWER_DIOPTERS: 44.25
OD_AXISANGLE2_DEGREES: 5

## 2024-08-14 ENCOUNTER — POST-OP CHECK (OUTPATIENT)
Dept: URBAN - METROPOLITAN AREA CLINIC 6 | Facility: CLINIC | Age: 74
End: 2024-08-14

## 2024-08-14 DIAGNOSIS — Z96.1: ICD-10-CM

## 2024-08-14 PROCEDURE — 99024 POSTOP FOLLOW-UP VISIT: CPT

## 2024-08-14 ASSESSMENT — KERATOMETRY
OS_AXISANGLE_DEGREES: 106
OD_AXISANGLE_DEGREES: 095
OS_K1POWER_DIOPTERS: 44.25
OD_K2POWER_DIOPTERS: 44.75
OD_K1POWER_DIOPTERS: 43.75
OS_K2POWER_DIOPTERS: 45.00
OS_AXISANGLE2_DEGREES: 16
OD_AXISANGLE2_DEGREES: 5

## 2024-08-14 ASSESSMENT — VISUAL ACUITY
OS_SC: 20/40-1
OD_SC: 20/50
OS_CC: 20/40+1
OU_CC: J1
OD_CC: 20/30-1

## 2024-08-14 ASSESSMENT — TONOMETRY
OS_IOP_MMHG: 14
OD_IOP_MMHG: 14

## 2024-10-23 ENCOUNTER — HOSPITAL ENCOUNTER (OUTPATIENT)
Dept: CT IMAGING | Facility: HOSPITAL | Age: 74
Discharge: HOME | End: 2024-10-23
Payer: COMMERCIAL

## 2024-10-23 DIAGNOSIS — R07.9 CHEST PAIN, UNSPECIFIED TYPE: ICD-10-CM

## 2024-10-23 PROCEDURE — 75571 CT HRT W/O DYE W/CA TEST: CPT

## 2024-11-07 ENCOUNTER — HOSPITAL ENCOUNTER (OUTPATIENT)
Dept: CT IMAGING | Facility: HOSPITAL | Age: 74
Discharge: HOME | End: 2024-11-07
Attending: INTERNAL MEDICINE
Payer: COMMERCIAL

## 2024-11-07 DIAGNOSIS — R91.8 OTHER NONSPECIFIC ABNORMAL FINDING OF LUNG FIELD: ICD-10-CM

## 2024-11-07 PROCEDURE — 71250 CT THORAX DX C-: CPT

## 2025-01-30 ENCOUNTER — PROBLEM (OUTPATIENT)
Dept: URBAN - METROPOLITAN AREA CLINIC 6 | Facility: CLINIC | Age: 75
End: 2025-01-30

## 2025-01-30 DIAGNOSIS — H02.882: ICD-10-CM

## 2025-01-30 DIAGNOSIS — H02.834: ICD-10-CM

## 2025-01-30 DIAGNOSIS — H26.493: ICD-10-CM

## 2025-01-30 DIAGNOSIS — H02.831: ICD-10-CM

## 2025-01-30 DIAGNOSIS — H02.885: ICD-10-CM

## 2025-01-30 DIAGNOSIS — H35.372: ICD-10-CM

## 2025-01-30 DIAGNOSIS — H53.2: ICD-10-CM

## 2025-01-30 PROCEDURE — 92012 INTRM OPH EXAM EST PATIENT: CPT

## 2025-01-30 ASSESSMENT — VISUAL ACUITY
OS_CC: 20/30+2
OU_CC: J1+
OD_CC: 20/30+2

## 2025-01-30 ASSESSMENT — TONOMETRY
OS_IOP_MMHG: 12
OD_IOP_MMHG: 11

## 2025-02-19 ENCOUNTER — PROCEDURE ONLY (OUTPATIENT)
Dept: URBAN - METROPOLITAN AREA CLINIC 6 | Facility: CLINIC | Age: 75
End: 2025-02-19

## 2025-02-19 DIAGNOSIS — H26.493: ICD-10-CM

## 2025-02-19 PROCEDURE — 66821 AFTER CATARACT LASER SURGERY: CPT

## 2025-02-19 ASSESSMENT — VISUAL ACUITY
OS_CC: 20/30+2
OD_CC: 20/30+2

## 2025-02-19 ASSESSMENT — TONOMETRY: OD_IOP_MMHG: 13
